# Patient Record
Sex: FEMALE | Race: WHITE | Employment: FULL TIME
[De-identification: names, ages, dates, MRNs, and addresses within clinical notes are randomized per-mention and may not be internally consistent; named-entity substitution may affect disease eponyms.]

---

## 2017-11-11 ENCOUNTER — HEALTH MAINTENANCE LETTER (OUTPATIENT)
Age: 57
End: 2017-11-11

## 2017-11-25 ENCOUNTER — HEALTH MAINTENANCE LETTER (OUTPATIENT)
Age: 57
End: 2017-11-25

## 2018-03-05 ENCOUNTER — OFFICE VISIT (OUTPATIENT)
Dept: OBGYN | Facility: CLINIC | Age: 58
End: 2018-03-05
Payer: COMMERCIAL

## 2018-03-05 VITALS
HEART RATE: 72 BPM | WEIGHT: 189 LBS | BODY MASS INDEX: 30.74 KG/M2 | SYSTOLIC BLOOD PRESSURE: 120 MMHG | DIASTOLIC BLOOD PRESSURE: 74 MMHG

## 2018-03-05 DIAGNOSIS — Z01.419 ENCOUNTER FOR GYNECOLOGICAL EXAMINATION WITHOUT ABNORMAL FINDING: Primary | ICD-10-CM

## 2018-03-05 DIAGNOSIS — B97.7 HIGH RISK HPV INFECTION: ICD-10-CM

## 2018-03-05 PROCEDURE — 87624 HPV HI-RISK TYP POOLED RSLT: CPT | Performed by: OBSTETRICS & GYNECOLOGY

## 2018-03-05 PROCEDURE — 88175 CYTOPATH C/V AUTO FLUID REDO: CPT | Performed by: OBSTETRICS & GYNECOLOGY

## 2018-03-05 PROCEDURE — 99396 PREV VISIT EST AGE 40-64: CPT | Performed by: OBSTETRICS & GYNECOLOGY

## 2018-03-05 NOTE — MR AVS SNAPSHOT
"              After Visit Summary   3/5/2018    Margarette Hardin    MRN: [id number]           Patient Information     Date Of Birth          1960        Visit Information        Provider Department      3/5/2018 11:15 AM Pola Blanco MD [facility]        Today's Diagnoses     Encounter for gynecological examination without abnormal finding    -  1    High risk HPV infection           Follow-ups after your visit        Follow-up notes from your care team     Return in about 1 year (around 3/5/2019).      Who to contact     If you have questions or need follow up information about today's clinic visit or your schedule please contact [facility] directly at [phone].  Normal or non-critical lab and imaging results will be communicated to you by MyChart, letter or phone within 4 business days after the clinic has received the results. If you do not hear from us within 7 days, please contact the clinic through MyChart or phone. If you have a critical or abnormal lab result, we will notify you by phone as soon as possible.  Submit refill requests through [facility] or call your pharmacy and they will forward the refill request to us. Please allow 3 business days for your refill to be completed.          Additional Information About Your Visit        MyChart Information     [facility] lets you send messages to your doctor, view your test results, renew your prescriptions, schedule appointments and more. To sign up, go to [email or web address] . Click on \"Log in\" on the left side of the screen, which will take you to the Welcome page. Then click on \"Sign up Now\" on the right side of the page.     You will be asked to enter the access code listed below, as well as some personal information. Please follow the directions to create your username and password.     Your access code is: [email or web address]  Expires: 6/3/2018 12:59 PM     Your access code will  in 90 days. If you need help " or a new code, please call your [facility] or [phone].        Care EveryWhere ID     This is your Care EveryWhere ID. This could be used by other organizations to access your [facility] medical records  [id number]        Your Vitals Were     Pulse Last Period BMI (Body Mass Index)             72 04/01/2012 30.74 kg/m2          Blood Pressure from Last 3 Encounters:   03/05/18 120/74   12/07/16 118/72   10/24/16 116/70    Weight from Last 3 Encounters:   03/05/18 189 lb (85.7 kg)   12/07/16 190 lb (86.2 kg)   10/24/16 181 lb (82.1 kg)              We Performed the Following     HPV High Risk Types DNA Cervical     Pap imaged thin layer diagnostic with HPV (select HPV order below)        Primary Care Provider Office Phone # Fax #    Jesica Patel [phone]       [address] [facility] [address]  [facility] [address]        Equal Access to Services     [facility]: Hadii aad ku hadasho Soomaali, waaxda luqadaha, qaybta kaalmada adeegyada, waxay idiin hayaan anat menard . So [facility] [phone].    ATENCIÓN: Si habla español, tiene a davidson disposición servicios gratuitos de asistencia lingüística. Llame al [phone].    We comply with applicable federal civil rights laws and [place] laws. We do not discriminate on the basis of race, color, national origin, age, disability, sex, sexual orientation, or gender identity.            Thank you!     Thank you for choosing [facility]  for your care. Our goal is always to provide you with excellent care. Hearing back from our patients is one way we can continue to improve our services. Please take a few minutes to complete the written survey that you may receive in the mail after your visit with us. Thank you!             Your Updated Medication List - Protect others around you: Learn how to safely use, store and throw away your medicines at www.disposemymeds.org.          This list is accurate as of 3/5/18 12:59 PM.   Always use your most recent med list.                   Brand Name Dispense Instructions for use Diagnosis    Multi-vitamin Tabs tablet   Generic drug:  multivitamin, therapeutic with minerals     30    1 TABLET DAILY    Contact dermatitis and other eczema, due to unspecified cause       order for DME     4 Device    Equipment being ordered:  Compression socks  15-20 mm hg  Dispense 4 pairs  Refill prn    Right foot pain, Foot swelling

## 2018-03-05 NOTE — PROGRESS NOTES
SUBJECTIVE:  Margarette Hardin is an 57 year old  P:2 postmenopausal woman who presents for annual gyn exam.       Past Medical History:   Diagnosis Date     Enthesopathy of hip region quiet     was prob a piriformis syndrome      H/O colposcopy with cervical biopsy 10/24/16    TYLER I     High risk HPV infection 16    HPV 16 with NIL pap       Family History   Problem Relation Age of Onset     Arthritis Paternal Grandmother      CANCER Father      lung cancer     DIABETES Father      CANCER Maternal Grandmother      breast cancer with mets     DIABETES Mother      insulin dependent     Eye Disorder Mother      cataracts and glaucoma diabetic retinopathy     OSTEOPOROSIS Mother      Cardiovascular Mother      cardiomyopathy     CANCER Mother      Stomach CA -  at age 80 -      HEART DISEASE Sister      tumor in heart     HEART DISEASE Sister      heart murmur and valve replacemtent due to rheumatic fever       Past Surgical History:   Procedure Laterality Date     C NONSPECIFIC PROCEDURE      ERCP     HC REMOVAL GALLBLADDER      laparoscopic cholecystectomy       Current Outpatient Prescriptions   Medication     MULTI-VITAMIN OR TABS     order for DME     No current facility-administered medications for this visit.      Allergies   Allergen Reactions     Amoxicillin Hives       Social History   Substance Use Topics     Smoking status: Never Smoker     Smokeless tobacco: Never Used     Alcohol use Yes      Comment: rare       ROS:  CONSTITUTIONAL: NEGATIVE for fever, chills  INTEGUMENTARY/SKIN: NEGATIVE for worrisome rashes, moles or lesions  EYES: NEGATIVE for vision changes   ENT/MOUTH: NEGATIVE for ear, mouth and throat problems  RESP: NEGATIVE for significant cough or SOB  BREAST: NEGATIVE for masses, tenderness or discharge  CV: NEGATIVE for chest pain, palpitations   GI: NEGATIVE for nausea, abdominal pain, heartburn, or change in bowel habits  : NEGATIVE for frequency, dysuria, or  hematuria  MUSCULOSKELETAL: NEGATIVE for significant arthralgias or myalgia  NEURO: NEGATIVE for weakness, dizziness or paresthesias or headache  ENDOCRINE: NEGATIVE for temperature intolerance, skin/hair changes  HEME: NEGATIVE for bleeding problems  PSYCHIATRIC: NEGATIVE for changes in mood or affect    OBJECTIVE:  Exam:  GENERAL APPEARANCE: healthy, alert and no distress  EYES: EOMI,  PERRL  HENT: ear canals and TM's normal and nose and mouth without ulcers or lesions  RESP: lungs clear to auscultation - no rales, rhonchi or wheezes  CV: regular rates and rhythm, normal S1 S2, no S3 or S4 and no murmur, click or rub -  BREASTS; no mass  ABDOMEN:  soft, nontender, no HSM or masses and bowel sounds normal    Pelvic Exam:  Urethra; negative  Vulva: No external lesions, normal hair distribution, no adenopathy  Vagina: Moist, pink, no abnormal discharge, well rugated, no lesions  Cervix: Pap smear is taken, parous, smooth, pink, no visible lesions  Uterus: Normal size, anteverted, non-tender, mobile   Ovaries: No mass, non-tender, mobile      ASSESSMENT/PLAN:  Gyn exam/pap smear    PE: reviewed health maintenance including diet, regular exercise and periodic exams.  Health Maintenance   Topic Date Due     HEPATITIS C SCREENING  1977     ADVANCE DIRECTIVE PLANNING Q5 YRS  2016     HPV Q1 Year  2017     PAP Q1 YR DIAGNOSTIC  10/24/2017     INFLUENZA VACCINE (SYSTEM ASSIGNED)  2018     MAMMO SCREEN Q2 YR (SYSTEM ASSIGNED)  10/24/2018     TETANUS IMMUNIZATION (SYSTEM ASSIGNED)  2020     COLON CANCER SCREEN (SYSTEM ASSIGNED)  2020     LIPID SCREEN Q5 YR FEMALE (SYSTEM ASSIGNED)  2021           SUBJECTIVE:  Margarette Hardin is an 57 year old  P:2 postmenopausal woman who presents for annual gyn exam.     Past Medical History:   Diagnosis Date     Enthesopathy of hip region quiet     was prob a piriformis syndrome      H/O colposcopy with cervical biopsy 10/24/16    TYLER I     High  risk HPV infection 16    HPV 16 with NIL pap       Family History   Problem Relation Age of Onset     Arthritis Paternal Grandmother      CANCER Father      lung cancer     DIABETES Father      CANCER Maternal Grandmother      breast cancer with mets     DIABETES Mother      insulin dependent     Eye Disorder Mother      cataracts and glaucoma diabetic retinopathy     OSTEOPOROSIS Mother      Cardiovascular Mother      cardiomyopathy     CANCER Mother      Stomach CA -  at age 80 - 2005     HEART DISEASE Sister      tumor in heart     HEART DISEASE Sister      heart murmur and valve replacemtent due to rheumatic fever       Past Surgical History:   Procedure Laterality Date     C NONSPECIFIC PROCEDURE      ERCP     HC REMOVAL GALLBLADDER      laparoscopic cholecystectomy       Current Outpatient Prescriptions   Medication     MULTI-VITAMIN OR TABS     order for DME     No current facility-administered medications for this visit.      Allergies   Allergen Reactions     Amoxicillin Hives       Social History   Substance Use Topics     Smoking status: Never Smoker     Smokeless tobacco: Never Used     Alcohol use Yes      Comment: rare       ROS:  CONSTITUTIONAL: NEGATIVE for fever, chills  INTEGUMENTARY/SKIN: NEGATIVE for worrisome rashes, moles or lesions  EYES: NEGATIVE for vision changes   ENT/MOUTH: NEGATIVE for ear, mouth and throat problems  RESP: NEGATIVE for significant cough or SOB  BREAST: NEGATIVE for masses, tenderness or discharge  CV: NEGATIVE for chest pain, palpitations   GI: NEGATIVE for nausea, abdominal pain, heartburn, or change in bowel habits  : vaginal dryness  MUSCULOSKELETAL: NEGATIVE for significant arthralgias or myalgia  NEURO: NEGATIVE for weakness, dizziness or paresthesias or headache  ENDOCRINE: NEGATIVE for temperature intolerance, skin/hair changes  HEME: NEGATIVE for bleeding problems  PSYCHIATRIC: NEGATIVE for changes in mood or affect    OBJECTIVE:  Exam:  GENERAL  APPEARANCE: healthy, alert and no distress  EYES: EOMI,  PERRL  HENT: ear canals and TM's normal and nose and mouth without ulcers or lesions  RESP: lungs clear to auscultation - no rales, rhonchi or wheezes  CV: regular rates and rhythm, normal S1 S2, no S3 or S4 and no murmur, click or rub -  BREASTS: no mass  ABDOMEN:  soft, nontender, no HSM or masses and bowel sounds normal    Pelvic Exam:  Urethra; negative  Vulva: No external lesions, normal hair distribution, no adenopathy  Vagina: Moist, pink, no abnormal discharge, well rugated, no lesions  Cervix: Pap smear is taken, parous, smooth, pink, no visible lesions  Uterus: Normal size, anteverted, non-tender, mobile   Ovaries: No mass, non-tender, mobile      ASSESSMENT/PLAN:  Vaginal dryness      -consider vaginal estrogen    (Z01.419) Encounter for gynecological examination without abnormal finding  (primary encounter diagnosis)  Plan: Pap imaged thin layer diagnostic with HPV         (select HPV order below), HPV High Risk Types         DNA Cervical         (B97.7) High risk HPV infection  Plan: Pap imaged thin layer diagnostic with HPV         (select HPV order below), HPV High Risk Types         DNA Cervical             PE: reviewed health maintenance including diet, regular exercise and periodic exams.  Health Maintenance   Topic Date Due     HEPATITIS C SCREENING  11/19/1977     ADVANCE DIRECTIVE PLANNING Q5 YRS  02/16/2016     HPV Q1 Year  09/08/2017     PAP Q1 YR DIAGNOSTIC  10/24/2017     INFLUENZA VACCINE (SYSTEM ASSIGNED)  09/01/2018     MAMMO SCREEN Q2 YR (SYSTEM ASSIGNED)  10/24/2018     TETANUS IMMUNIZATION (SYSTEM ASSIGNED)  02/25/2020     COLON CANCER SCREEN (SYSTEM ASSIGNED)  05/13/2020     LIPID SCREEN Q5 YR FEMALE (SYSTEM ASSIGNED)  09/08/2021

## 2018-03-05 NOTE — LETTER
March 14, 2018    Margarette Hardin  [address] [facility] [address]    Dear Margarette,  We are happy to inform you that your PAP smear result from 03/05/18 is normal.  We are now able to do a follow up test on PAP smears. The DNA test is for HPV (Human Papilloma Virus). Cervical cancer is closely linked with certain types of HPV. Your result showed no evidence of high risk HPV.  Therefore we recommend you return in 3 years for your next pap smear and HPV test.  You will still need to return to the clinic every year for an annual exam and other preventive tests.  Please contact the clinic at [phone] with any questions.  Sincerely,    Pola Blanco MD/[facility]

## 2018-03-05 NOTE — NURSING NOTE
"Chief Complaint   Patient presents with     Gyn Exam     pap and breast exam - colp 10/24/16 - pap 09/08/16 NIL - HPV 16 DNA - mammogram 10/24/16       Initial /74 (BP Location: Right arm, Patient Position: Chair, Cuff Size: Adult Regular)  Pulse 72  Wt 189 lb (85.7 kg)  LMP 04/01/2012  BMI 30.74 kg/m2 Estimated body mass index is 30.74 kg/(m^2) as calculated from the following:    Height as of 12/7/16: 5' 5.75\" (1.67 m).    Weight as of this encounter: 189 lb (85.7 kg).  Medication Reconciliation: complete     Nurse assisted visit.  Jayde Dorado MA.      "

## 2018-03-07 LAB
COPATH REPORT: NORMAL
PAP: NORMAL

## 2018-03-08 LAB
FINAL DIAGNOSIS: NORMAL
HPV HR 12 DNA CVX QL NAA+PROBE: NEGATIVE
HPV16 DNA SPEC QL NAA+PROBE: NEGATIVE
HPV18 DNA SPEC QL NAA+PROBE: NEGATIVE
SPECIMEN DESCRIPTION: NORMAL
SPECIMEN SOURCE CVX/VAG CYTO: NORMAL

## 2019-09-20 ENCOUNTER — OFFICE VISIT (OUTPATIENT)
Dept: OBGYN | Facility: CLINIC | Age: 59
End: 2019-09-20
Payer: COMMERCIAL

## 2019-09-20 VITALS
BODY MASS INDEX: 30.82 KG/M2 | DIASTOLIC BLOOD PRESSURE: 70 MMHG | HEIGHT: 66 IN | SYSTOLIC BLOOD PRESSURE: 124 MMHG | WEIGHT: 191.8 LBS

## 2019-09-20 DIAGNOSIS — N95.2 ATROPHIC VAGINITIS: ICD-10-CM

## 2019-09-20 DIAGNOSIS — Z12.31 ENCOUNTER FOR SCREENING MAMMOGRAM FOR BREAST CANCER: ICD-10-CM

## 2019-09-20 DIAGNOSIS — Z01.419 ENCOUNTER FOR GYNECOLOGICAL EXAMINATION WITHOUT ABNORMAL FINDING: Primary | ICD-10-CM

## 2019-09-20 PROCEDURE — 99396 PREV VISIT EST AGE 40-64: CPT | Performed by: OBSTETRICS & GYNECOLOGY

## 2019-09-20 RX ORDER — ESTRADIOL 0.1 MG/G
1 CREAM VAGINAL AT BEDTIME
Qty: 42.5 G | Refills: 3 | Status: SHIPPED | OUTPATIENT
Start: 2019-09-20 | End: 2019-10-11

## 2019-09-20 RX ORDER — CALCIUM CARBONATE 500(1250)
1 TABLET ORAL AT BEDTIME
COMMUNITY

## 2019-09-20 ASSESSMENT — MIFFLIN-ST. JEOR: SCORE: 1461.75

## 2019-09-20 NOTE — PROGRESS NOTES
SUBJECTIVE:   CC: Margarette Hardin is an 58 year old woman who presents for preventive health visit.     Healthy Habits:    Do you get at least three servings of calcium containing foods daily (dairy, green leafy vegetables, etc.)? yes    Amount of exercise or daily activities, outside of work: 2 day(s) per week    Problems taking medications regularly not applicable    Medication side effects: No    Have you had an eye exam in the past two years? no    Do you see a dentist twice per year? yes    Do you have sleep apnea, excessive snoring or daytime drowsiness?yes      Pt c/o vaginal dryness and would like to try some vaginal estradiol.  Has been  x 6 years on no HRT in the past.  No VB or abnl discharge.    Today's PHQ-2 Score:   PHQ-2 ( 1999 [employer]) 12/7/2016 9/8/2016   Q1: Little interest or pleasure in doing things 0 0   Q2: Feeling down, depressed or hopeless 0 0   PHQ-2 Score 0 0       Abuse: Current or Past(Physical, Sexual or Emotional)- No  Do you feel safe in your environment? Yes    Social History     Tobacco Use     Smoking status: Never Smoker     Smokeless tobacco: Never Used   Substance Use Topics     Alcohol use: Yes     Comment: rare     If you drink alcohol do you typically have >3 drinks per day or >7 drinks per week? No                     Reviewed orders with patient.  Reviewed health maintenance and updated orders accordingly - Yes  Current Outpatient Medications   Medication Sig Dispense Refill     calcium carbonate (OS-KINGS) 500 MG tablet Take 1 tablet by mouth At Bedtime       estradiol (ESTRACE) 0.1 MG/GM vaginal cream Place 1 g vaginally At Bedtime for 21 days May repeat course every 3 months as needed. 42.5 g 3     MULTI-VITAMIN OR TABS 1 TABLET DAILY 30 0     order for DME Equipment being ordered:   Compression socks   15-20 mm hg   Dispense 4 pairs   Refill prn 4 Device prn     Allergies   Allergen Reactions     Amoxicillin Hives       Mammogram Screening: Patient over age 50, mutual  decision to screen reflected in health maintenance.    Pertinent mammograms are reviewed under the imaging tab.  History of abnormal Pap smear: YES - updated in Problem List and Health Maintenance accordingly  PAP / HPV Latest Ref Rng & Units 3/5/2018 2016 2013   PAP - NIL NIL NIL   HPV 16 DNA NEG:Negative Negative Positive(A) -   HPV 18 DNA NEG:Negative Negative Negative -   OTHER HR HPV NEG:Negative Negative Negative -     Reviewed and updated as needed this visit by clinical staff  Tobacco  Allergies  Meds  Med Hx  Surg Hx  Fam Hx  Soc Hx        Reviewed and updated as needed this visit by Provider  Tobacco  Allergies  Meds  Med Hx  Surg Hx  Fam Hx  Soc Hx       Past Medical History:   Diagnosis Date     Enthesopathy of hip region quiet     was prob a piriformis syndrome      H/O colposcopy with cervical biopsy 10/24/16    TYLER I     High risk HPV infection 16    HPV 16 with NIL pap      Past Surgical History:   Procedure Laterality Date     C NONSPECIFIC PROCEDURE      ERCP     HC REMOVAL GALLBLADDER      laparoscopic cholecystectomy     OB History    Para Term  AB Living   2 2 2 0 0 2   SAB TAB Ectopic Multiple Live Births   0 0 0 0 2      # Outcome Date GA Lbr Martir/2nd Weight Sex Delivery Anes PTL Lv   2 Term     F Vag-Spont   JEAN MARIE   1 Term     M Vag-Spont   JEAN MARIE       ROS:  CONSTITUTIONAL: NEGATIVE for fever, chills, change in weight  INTEGUMENTARY/SKIN: NEGATIVE for worrisome rashes, moles or lesions  EYES: NEGATIVE for vision changes or irritation  ENT: NEGATIVE for ear, mouth and throat problems  RESP: NEGATIVE for significant cough or SOB  BREAST: NEGATIVE for masses, tenderness or discharge  CV: NEGATIVE for chest pain, palpitations or peripheral edema  GI: NEGATIVE for nausea, abdominal pain, heartburn, or change in bowel habits  : NEGATIVE for unusual urinary or vaginal symptoms. No vaginal bleeding.  MUSCULOSKELETAL: NEGATIVE for significant arthralgias or  "myalgia  NEURO: NEGATIVE for weakness, dizziness or paresthesias  PSYCHIATRIC: NEGATIVE for changes in mood or affect     OBJECTIVE:   /70   Ht 1.676 m (5' 6\")   Wt 87 kg (191 lb 12.8 oz)   LMP 04/01/2012   BMI 30.96 kg/m    EXAM:  GENERAL APPEARANCE: healthy, alert and no distress  EYES: Eyes grossly normal to inspection, PERRL and conjunctivae and sclerae normal  HENT: ear canals and TM's normal, nose and mouth without ulcers or lesions, oropharynx clear and oral mucous membranes moist  NECK: no adenopathy, no asymmetry, masses, or scars and thyroid normal to palpation  RESP: lungs clear to auscultation - no rales, rhonchi or wheezes  BREAST: normal without masses, tenderness or nipple discharge and no palpable axillary masses or adenopathy  CV: regular rate and rhythm, normal S1 S2, no S3 or S4, no murmur, click or rub, no peripheral edema and peripheral pulses strong  ABDOMEN: soft, nontender, no hepatosplenomegaly, no masses and bowel sounds normal   (female): normal female external genitalia, normal urethral meatus, vaginal mucosal atrophy noted, normal cervix, adnexae, and uterus without masses or abnormal discharge  MS: no musculoskeletal defects are noted and gait is age appropriate without ataxia  SKIN: no suspicious lesions or rashes  NEURO: Normal strength and tone, sensory exam grossly normal, mentation intact and speech normal  PSYCH: mentation appears normal and affect normal/bright    Diagnostic Test Results:  none     ASSESSMENT/PLAN:       ICD-10-CM    1. Encounter for gynecological examination without abnormal finding Z01.419    2. Encounter for screening mammogram for breast cancer Z12.31 MA Screening Digital Bilateral   3. Atrophic vaginitis N95.2 estradiol (ESTRACE) 0.1 MG/GM vaginal cream     Rx Estrace vag cream 1 g at bedtime x 3 weeks.  Repeat course Q 3 months.    Mammogram scheduled.    COUNSELING:   Reviewed preventive health counseling, as reflected in patient instructions   " "    ([place]ne)menopause management    Estimated body mass index is 30.96 kg/m  as calculated from the following:    Height as of this encounter: 1.676 m (5' 6\").    Weight as of this encounter: 87 kg (191 lb 12.8 oz).    Weight management plan: Discussed healthy diet and exercise guidelines     reports that she has never smoked. She has never used smokeless tobacco.      Counseling Resources:  ATP IV Guidelines  Pooled Cohorts Equation Calculator  Breast Cancer Risk Calculator  FRAX Risk Assessment  ICSI Preventive Guidelines  Dietary Guidelines for Americans, 2010  USDA's MyPlate  ASA Prophylaxis  Lung CA Screening    Don Woodson MD  [facility]  "

## 2019-09-20 NOTE — NURSING NOTE
"Chief Complaint   Patient presents with     Gyn Exam   c/o vaginal dryness    Initial /70   Ht 1.676 m (5' 6\")   Wt 87 kg (191 lb 12.8 oz)   LMP 2012   BMI 30.96 kg/m   Estimated body mass index is 30.96 kg/m  as calculated from the following:    Height as of this encounter: 1.676 m (5' 6\").    Weight as of this encounter: 87 kg (191 lb 12.8 oz).  BP completed using cuff size: large    Questioned patient about current smoking habits.  Pt. has never smoked.          The following HM Due: mammogram  Chantal Ariza CMA               "

## 2019-09-25 ENCOUNTER — HOSPITAL ENCOUNTER (OUTPATIENT)
Dept: MAMMOGRAPHY | Facility: CLINIC | Age: 59
Discharge: HOME | End: 2019-09-25
Attending: OBSTETRICS & GYNECOLOGY | Admitting: OBSTETRICS & GYNECOLOGY
Payer: COMMERCIAL

## 2019-09-25 DIAGNOSIS — Z12.31 ENCOUNTER FOR SCREENING MAMMOGRAM FOR BREAST CANCER: ICD-10-CM

## 2019-09-25 PROCEDURE — 77067 SCR MAMMO BI INCL CAD: CPT

## 2020-11-20 ENCOUNTER — OFFICE VISIT (OUTPATIENT)
Dept: OBGYN | Facility: CLINIC | Age: 60
End: 2020-11-20
Payer: COMMERCIAL

## 2020-11-20 VITALS — SYSTOLIC BLOOD PRESSURE: 138 MMHG | BODY MASS INDEX: 31.15 KG/M2 | WEIGHT: 193 LBS | DIASTOLIC BLOOD PRESSURE: 78 MMHG

## 2020-11-20 DIAGNOSIS — R10.32 LEFT GROIN PAIN: ICD-10-CM

## 2020-11-20 DIAGNOSIS — L65.9 HAIR LOSS: ICD-10-CM

## 2020-11-20 DIAGNOSIS — N87.0 DYSPLASIA OF CERVIX, LOW GRADE (CIN 1): ICD-10-CM

## 2020-11-20 DIAGNOSIS — Z00.00 ROUTINE GENERAL MEDICAL EXAMINATION AT A HEALTH CARE FACILITY: Primary | ICD-10-CM

## 2020-11-20 DIAGNOSIS — Z12.31 VISIT FOR SCREENING MAMMOGRAM: ICD-10-CM

## 2020-11-20 DIAGNOSIS — N95.2 ATROPHIC VAGINITIS: ICD-10-CM

## 2020-11-20 PROCEDURE — 87624 HPV HI-RISK TYP POOLED RSLT: CPT | Performed by: OBSTETRICS & GYNECOLOGY

## 2020-11-20 PROCEDURE — 99396 PREV VISIT EST AGE 40-64: CPT | Performed by: OBSTETRICS & GYNECOLOGY

## 2020-11-20 PROCEDURE — 84443 ASSAY THYROID STIM HORMONE: CPT | Performed by: OBSTETRICS & GYNECOLOGY

## 2020-11-20 PROCEDURE — 99214 OFFICE O/P EST MOD 30 MIN: CPT | Mod: 25 | Performed by: OBSTETRICS & GYNECOLOGY

## 2020-11-20 PROCEDURE — 36415 COLL VENOUS BLD VENIPUNCTURE: CPT | Performed by: OBSTETRICS & GYNECOLOGY

## 2020-11-20 RX ORDER — ESTRADIOL 0.1 MG/G
1 CREAM VAGINAL DAILY
Qty: 21 G | Refills: 3 | Status: SHIPPED | OUTPATIENT
Start: 2020-11-20 | End: 2020-12-11

## 2020-11-20 NOTE — LETTER
December 2, 2020      Margarette Hardin  [address] [facility] [address]        Dear ,    We are happy to inform you that your recent Pap smear and Human Papillomavirus (HPV) test results are normal and negative.    It is recommended that you have your next Pap smear and Human Papillomavirus (HPV) test in 3 years. You will also need to return to the clinic every year for an annual wellness visit.    If you have additional questions regarding this result, please contact our office and we will be happy to assist you.      Sincerely,    Your [facility] Care Team

## 2020-11-20 NOTE — NURSING NOTE
"Chief Complaint   Patient presents with     Gyn Exam     Annual        Initial /78 (BP Location: Right arm, Patient Position: Sitting, Cuff Size: Adult Regular)   Wt 87.5 kg (193 lb)   LMP 2012   BMI 31.15 kg/m   Estimated body mass index is 31.15 kg/m  as calculated from the following:    Height as of 19: 1.676 m (5' 6\").    Weight as of this encounter: 87.5 kg (193 lb).  BP completed using cuff size: regular    Questioned patient about current smoking habits.  Pt. has never smoked.          The following HM Due: NONE    Everette Kyle CMA              "

## 2020-11-20 NOTE — PROGRESS NOTES
SUBJECTIVE:   CC: Margarette Hardin is an 60 year old woman who presents for preventive health visit.       Patient has been advised of split billing requirements and indicates understanding: Yes  Healthy Habits:    Do you get at least three servings of calcium containing foods daily (dairy, green leafy vegetables, etc.)? yes    Amount of exercise or daily activities, outside of work: 2-3 day(s) per week    Problems taking medications regularly No    Medication side effects: No    Have you had an eye exam in the past two years? no    Do you see a dentist twice per year? yes    Do you have sleep apnea, excessive snoring or daytime drowsiness?no    Pt had prior Pap-WNL, Pos HPV 16 (9/8/2016), Colpo (10/24/2016) - EctoCx TYLER 1, ECC- ?TYLER.  Pap-WNL, HPV Neg (3/5/2018).  Will do her Pap/HPV today although it is 4 months earlier than usual.    Is using Estradiol cream for atrophic vaginitis with some improvement.  Wants Rx.    Also c/o left groin pain:    LEFT GROIN PAIN     Onset: 3-4 months ago    Description: Has had episodes of left groin pain  Character: Sharp  Location: left inguinal region  Radiation: upwards to left side of abdomen    Intensity: moderate    Progression of Symptoms:  intermittent    Accompanying Signs & Symptoms:  Fever/Chills?: no   Gas/Bloating: no   Nausea: no   Vomiting: no   Diarrhea?: no   Constipation:no   Dysuria or Hematuria: no    History:   Trauma: no   Previous similar pain: no    Previous tests done: none    Precipitating factors:   Does the pain change with:     Food: no      BM: no     Urination: no     Alleviating factors:  None    Therapies Tried and outcome: None-resolves each time after less than a minute typically    LMP:   on vaginal estradiol only       Also c/o some increased hair loss.    Today's PHQ-2 Score:   PHQ-2 ( 1999 Pfizer) 12/7/2016 9/8/2016   Q1: Little interest or pleasure in doing things 0 0   Q2: Feeling down, depressed or hopeless 0 0   PHQ-2 Score 0 0        Abuse: Current or Past(Physical, Sexual or Emotional)- No  Do you feel safe in your environment? Yes    Have you ever done Advance Care Planning? (For example, a Health Directive, POLST, or a discussion with a medical provider or your loved ones about your wishes): No, advance care planning information given to patient to review.  Patient declined advance care planning discussion at this time.    Social History     Tobacco Use     Smoking status: Never Smoker     Smokeless tobacco: Never Used   Substance Use Topics     Alcohol use: Yes     Comment: rare     If you drink alcohol do you typically have >3 drinks per day or >7 drinks per week? No                     Reviewed orders with patient.  Reviewed health maintenance and updated orders accordingly - Yes  Current Outpatient Medications   Medication Sig Dispense Refill     calcium carbonate (OS-KINGS) 500 MG tablet Take 1 tablet by mouth At Bedtime       MULTI-VITAMIN OR TABS 1 TABLET DAILY 30 0     order for DME Equipment being ordered:   Compression socks   15-20 mm hg   Dispense 4 pairs   Refill prn 4 Device prn     Allergies   Allergen Reactions     Amoxicillin Hives       Mammogram Screening: Patient over age 50, mutual decision to screen reflected in health maintenance.    Pertinent mammograms are reviewed under the imaging tab.  History of abnormal Pap smear: YES - updated in Problem List and Health Maintenance accordingly  PAP / HPV Latest Ref Rng & Units 3/5/2018 9/8/2016 11/11/2013   PAP - NIL NIL NIL   HPV 16 DNA NEG:Negative Negative Positive(A) -   HPV 18 DNA NEG:Negative Negative Negative -   OTHER HR HPV NEG:Negative Negative Negative -     Reviewed and updated as needed this visit by clinical staff  Tobacco  Allergies  Meds   Med Hx  Surg Hx  Fam Hx  Soc Hx        Reviewed and updated as needed this visit by Provider                Past Medical History:   Diagnosis Date     Enthesopathy of hip region quiet     was prob a piriformis  syndrome      H/O colposcopy with cervical biopsy 10/24/16    TYLER I     High risk HPV infection 16    HPV 16 with NIL pap      Past Surgical History:   Procedure Laterality Date     HC REMOVAL GALLBLADDER      laparoscopic cholecystectomy     ZZC NONSPECIFIC PROCEDURE      ERCP     OB History    Para Term  AB Living   2 2 2 0 0 2   SAB TAB Ectopic Multiple Live Births   0 0 0 0 2      # Outcome Date GA Lbr Martir/2nd Weight Sex Delivery Anes PTL Lv   2 Term     F Vag-Spont   JEAN MARIE   1 Term     M Vag-Spont   JEAN MARIE       ROS:  CONSTITUTIONAL: NEGATIVE for fever, chills, change in weight  INTEGUMENTARY/SKIN: NEGATIVE for worrisome rashes, moles or lesions  EYES: NEGATIVE for vision changes or irritation  ENT: NEGATIVE for ear, mouth and throat problems  RESP: NEGATIVE for significant cough or SOB  BREAST: NEGATIVE for masses, tenderness or discharge   CV: NEGATIVE for chest pain, palpitations or peripheral edema  GI: NEGATIVE for nausea, abdominal pain, heartburn, or change in bowel habits  : NEGATIVE for unusual urinary or vaginal symptoms. No vaginal bleeding.  MUSCULOSKELETAL: NEGATIVE for significant arthralgias or myalgia  NEURO: NEGATIVE for weakness, dizziness or paresthesias  PSYCHIATRIC: NEGATIVE for changes in mood or affect     OBJECTIVE:   /78 (BP Location: Right arm, Patient Position: Sitting, Cuff Size: Adult Regular)   Wt 87.5 kg (193 lb)   LMP 2012   BMI 31.15 kg/m    EXAM:  GENERAL APPEARANCE: healthy, alert and no distress  EYES: Eyes grossly normal to inspection, PERRL and conjunctivae and sclerae normal  HENT: ear canals and TM's normal, nose and mouth without ulcers or lesions, oropharynx clear and oral mucous membranes moist  NECK: no adenopathy, no asymmetry, masses, or scars and thyroid normal to palpation  RESP: lungs clear to auscultation - no rales, rhonchi or wheezes  BREAST: normal without masses, tenderness or nipple discharge and no palpable axillary  "masses or adenopathy  CV: regular rate and rhythm, normal S1 S2, no S3 or S4, no murmur, click or rub, no peripheral edema and peripheral pulses strong  ABDOMEN: soft, nontender, no hepatosplenomegaly, no masses and bowel sounds normal   (female): normal female external genitalia except mild atrophy, normal urethral meatus, vaginal mucosal atrophy noted, normal cervix, adnexae, and uterus without masses or abnormal discharge  MS: no musculoskeletal defects are noted and gait is age appropriate without ataxia  SKIN: no suspicious lesions or rashes  NEURO: Normal strength and tone, sensory exam grossly normal, mentation intact and speech normal  PSYCH: mentation appears normal and affect normal/bright    Diagnostic Test Results:  none     ASSESSMENT/PLAN:       ICD-10-CM    1. Routine general medical examination at a health care facility  Z00.00 TSH with free T4 reflex   2. Dysplasia of cervix, low grade (TYLER 1)  N87.0 Pap imaged thin layer diagnostic with HPV (select HPV order below)     HPV High Risk Types DNA Cervical   3. Left groin pain  R10.32 US Pelvic Complete with Transvaginal   4. Atrophic vaginitis  N95.2 estradiol (ESTRACE) 0.1 MG/GM vaginal cream   5. Visit for screening mammogram  Z12.31 MA Screening Digital Bilateral   6. Hair loss  L65.9        Pap/HPV done.  If all neg, next due 3 years.    TSH.  If abnormal, refer to PCP.    Mammogram    Pelvic U/S to confirm no left adnexal pathology.  If present, will address accordingly.  If neg and left groin pain continues, Pt to f/u w/ PCP.    Rx Estrace vag cream.    Patient has been advised of split billing requirements and indicates understanding: Yes  COUNSELING:   Reviewed preventive health counseling, as reflected in patient instructions    Estimated body mass index is 31.15 kg/m  as calculated from the following:    Height as of 9/20/19: 1.676 m (5' 6\").    Weight as of this encounter: 87.5 kg (193 lb).    Weight management plan: Discussed healthy diet " and exercise guidelines    She reports that she has never smoked. She has never used smokeless tobacco.      Counseling Resources:  ATP IV Guidelines  Pooled Cohorts Equation Calculator  Breast Cancer Risk Calculator  BRCA-Related Cancer Risk Assessment: FHS-7 Tool  FRAX Risk Assessment  ICSI Preventive Guidelines  Dietary Guidelines for Americans, 2010  USDA's MyPlate  ASA Prophylaxis  Lung CA Screening    Don Woodson MD  [facility] [place]

## 2020-11-21 LAB — TSH SERPL DL<=0.005 MIU/L-ACNC: 3.43 MU/L (ref 0.4–4)

## 2020-11-25 LAB
COPATH REPORT: NORMAL
PAP: NORMAL

## 2020-12-07 ENCOUNTER — ANCILLARY PROCEDURE (OUTPATIENT)
Dept: ULTRASOUND IMAGING | Facility: CLINIC | Age: 60
End: 2020-12-07
Attending: OBSTETRICS & GYNECOLOGY
Payer: COMMERCIAL

## 2020-12-07 DIAGNOSIS — R10.32 LEFT GROIN PAIN: ICD-10-CM

## 2020-12-07 PROCEDURE — 76830 TRANSVAGINAL US NON-OB: CPT | Performed by: FAMILY MEDICINE

## 2020-12-07 PROCEDURE — 76856 US EXAM PELVIC COMPLETE: CPT | Performed by: FAMILY MEDICINE

## 2020-12-19 ENCOUNTER — RESULTS ONLY (OUTPATIENT)
Dept: LAB | Age: 60
End: 2020-12-19

## 2020-12-19 ENCOUNTER — OFFICE VISIT (OUTPATIENT)
Dept: URGENT CARE | Facility: URGENT CARE | Age: 60
End: 2020-12-19
Payer: COMMERCIAL

## 2020-12-19 DIAGNOSIS — Z53.9 ERRONEOUS ENCOUNTER--DISREGARD: Primary | ICD-10-CM

## 2020-12-19 LAB
SARS-COV-2 RNA SPEC QL NAA+PROBE: NORMAL
SPECIMEN SOURCE: NORMAL

## 2020-12-20 LAB
LABORATORY COMMENT REPORT: NORMAL
SARS-COV-2 RNA SPEC QL NAA+PROBE: NEGATIVE
SPECIMEN SOURCE: NORMAL

## 2021-03-08 ENCOUNTER — HOSPITAL ENCOUNTER (OUTPATIENT)
Dept: MAMMOGRAPHY | Facility: CLINIC | Age: 61
Discharge: HOME | End: 2021-03-08
Attending: OBSTETRICS & GYNECOLOGY | Admitting: OBSTETRICS & GYNECOLOGY
Payer: COMMERCIAL

## 2021-03-08 DIAGNOSIS — Z12.31 VISIT FOR SCREENING MAMMOGRAM: ICD-10-CM

## 2021-03-08 PROCEDURE — 77067 SCR MAMMO BI INCL CAD: CPT

## 2021-06-29 ENCOUNTER — OFFICE VISIT (OUTPATIENT)
Dept: INTERNAL MEDICINE | Facility: CLINIC | Age: 61
End: 2021-06-29
Payer: COMMERCIAL

## 2021-06-29 ENCOUNTER — TELEPHONE (OUTPATIENT)
Dept: VASCULAR SURGERY | Facility: CLINIC | Age: 61
End: 2021-06-29

## 2021-06-29 VITALS
WEIGHT: 192 LBS | TEMPERATURE: 98.3 F | SYSTOLIC BLOOD PRESSURE: 138 MMHG | BODY MASS INDEX: 30.86 KG/M2 | HEART RATE: 65 BPM | RESPIRATION RATE: 20 BRPM | OXYGEN SATURATION: 98 % | HEIGHT: 66 IN | DIASTOLIC BLOOD PRESSURE: 76 MMHG

## 2021-06-29 DIAGNOSIS — Z12.11 SPECIAL SCREENING FOR MALIGNANT NEOPLASMS, COLON: ICD-10-CM

## 2021-06-29 DIAGNOSIS — I83.891 VARICOSE VEINS OF LEG WITH SWELLING, RIGHT: ICD-10-CM

## 2021-06-29 DIAGNOSIS — Z00.00 ROUTINE GENERAL MEDICAL EXAMINATION AT A HEALTH CARE FACILITY: Primary | ICD-10-CM

## 2021-06-29 DIAGNOSIS — Z11.59 NEED FOR HEPATITIS C SCREENING TEST: ICD-10-CM

## 2021-06-29 DIAGNOSIS — K64.9 BLEEDING HEMORRHOIDS: ICD-10-CM

## 2021-06-29 DIAGNOSIS — Z11.4 ENCOUNTER FOR SCREENING FOR HIV: ICD-10-CM

## 2021-06-29 DIAGNOSIS — R73.09 ELEVATED GLUCOSE: ICD-10-CM

## 2021-06-29 LAB
BASOPHILS # BLD AUTO: 0 10E9/L (ref 0–0.2)
BASOPHILS NFR BLD AUTO: 0.6 %
DIFFERENTIAL METHOD BLD: NORMAL
EOSINOPHIL # BLD AUTO: 0.1 10E9/L (ref 0–0.7)
EOSINOPHIL NFR BLD AUTO: 1.8 %
ERYTHROCYTE [DISTWIDTH] IN BLOOD BY AUTOMATED COUNT: 12.7 % (ref 10–15)
HBA1C MFR BLD: 5.5 % (ref 0–5.6)
HCT VFR BLD AUTO: 41.3 % (ref 35–47)
HCV AB SERPL QL IA: NONREACTIVE
HGB BLD-MCNC: 13.9 G/DL (ref 11.7–15.7)
HIV 1+2 AB+HIV1 P24 AG SERPL QL IA: NONREACTIVE
LYMPHOCYTES # BLD AUTO: 1.6 10E9/L (ref 0.8–5.3)
LYMPHOCYTES NFR BLD AUTO: 31.7 %
MCH RBC QN AUTO: 30.2 PG (ref 26.5–33)
MCHC RBC AUTO-ENTMCNC: 33.7 G/DL (ref 31.5–36.5)
MCV RBC AUTO: 90 FL (ref 78–100)
MONOCYTES # BLD AUTO: 0.4 10E9/L (ref 0–1.3)
MONOCYTES NFR BLD AUTO: 8.1 %
NEUTROPHILS # BLD AUTO: 2.9 10E9/L (ref 1.6–8.3)
NEUTROPHILS NFR BLD AUTO: 57.8 %
PLATELET # BLD AUTO: 211 10E9/L (ref 150–450)
RBC # BLD AUTO: 4.6 10E12/L (ref 3.8–5.2)
WBC # BLD AUTO: 5.1 10E9/L (ref 4–11)

## 2021-06-29 PROCEDURE — 80050 GENERAL HEALTH PANEL: CPT | Performed by: NURSE PRACTITIONER

## 2021-06-29 PROCEDURE — 87389 HIV-1 AG W/HIV-1&-2 AB AG IA: CPT | Performed by: NURSE PRACTITIONER

## 2021-06-29 PROCEDURE — 36415 COLL VENOUS BLD VENIPUNCTURE: CPT | Performed by: NURSE PRACTITIONER

## 2021-06-29 PROCEDURE — 86803 HEPATITIS C AB TEST: CPT | Performed by: NURSE PRACTITIONER

## 2021-06-29 PROCEDURE — 83036 HEMOGLOBIN GLYCOSYLATED A1C: CPT | Performed by: NURSE PRACTITIONER

## 2021-06-29 PROCEDURE — 80061 LIPID PANEL: CPT | Performed by: NURSE PRACTITIONER

## 2021-06-29 PROCEDURE — 99396 PREV VISIT EST AGE 40-64: CPT | Performed by: NURSE PRACTITIONER

## 2021-06-29 RX ORDER — HYDROCORTISONE 25 MG/G
CREAM TOPICAL 2 TIMES DAILY PRN
Qty: 30 G | Refills: 3 | Status: SHIPPED | OUTPATIENT
Start: 2021-06-29

## 2021-06-29 ASSESSMENT — ENCOUNTER SYMPTOMS
MYALGIAS: 1
ABDOMINAL PAIN: 0
CHILLS: 0
DYSURIA: 0
NAUSEA: 0
ARTHRALGIAS: 0
NERVOUS/ANXIOUS: 1
WEAKNESS: 0
EYE PAIN: 0
BREAST MASS: 0
FEVER: 0
PARESTHESIAS: 0
DIZZINESS: 0
FREQUENCY: 0
CONSTIPATION: 0
JOINT SWELLING: 0
DIARRHEA: 0
HEARTBURN: 0
HEMATURIA: 0
HEMATOCHEZIA: 1
COUGH: 0
HEADACHES: 1
SORE THROAT: 0
PALPITATIONS: 0
SHORTNESS OF BREATH: 0

## 2021-06-29 ASSESSMENT — MIFFLIN-ST. JEOR: SCORE: 1452.66

## 2021-06-29 NOTE — NURSING NOTE
"Chief Complaint   Patient presents with     Physical     fasting     initial /76   Pulse 65   Temp 98.3  F (36.8  C) (Oral)   Resp 20   Ht 1.676 m (5' 6\")   Wt 87.1 kg (192 lb)   LMP 04/01/2012   SpO2 98%   BMI 30.99 kg/m   Estimated body mass index is 30.99 kg/m  as calculated from the following:    Height as of this encounter: 1.676 m (5' 6\").    Weight as of this encounter: 87.1 kg (192 lb)..  bp completed using cuff size large  HU MONSIVAIS LPN  "

## 2021-06-29 NOTE — PROGRESS NOTES
SUBJECTIVE:   CC: Margarette Hardin is an 60 year old woman who presents for preventive health visit.       Patient has been advised of split billing requirements and indicates understanding:   Healthy Habits:     Getting at least 3 servings of Calcium per day:  Yes    Bi-annual eye exam:  Yes    Dental care twice a year:  Yes    Sleep apnea or symptoms of sleep apnea:  None    Diet:  Regular (no restrictions)    Frequency of exercise:  2-3 days/week    Duration of exercise:  15-30 minutes    Taking medications regularly:  Yes    Medication side effects:  None    PHQ-2 Total Score: 0    Additional concerns today:  Yes        Today's PHQ-2 Score:   PHQ-2 ( 1999 Pfizer) 6/29/2021   Q1: Little interest or pleasure in doing things 0   Q2: Feeling down, depressed or hopeless 0   PHQ-2 Score 0   Q1: Little interest or pleasure in doing things Not at all   Q2: Feeling down, depressed or hopeless Not at all   PHQ-2 Score 0       Abuse: Current or Past (Physical, Sexual or Emotional) - No  Do you feel safe in your environment? Yes        Social History     Tobacco Use     Smoking status: Never Smoker     Smokeless tobacco: Never Used   Substance Use Topics     Alcohol use: Yes     Comment: rare         Alcohol Use 6/29/2021   Prescreen: >3 drinks/day or >7 drinks/week? No   Prescreen: >3 drinks/day or >7 drinks/week? -       Reviewed orders with patient.  Reviewed health maintenance and updated orders accordingly - Yes      Breast Cancer Screening:  Any new diagnosis of family breast, ovarian, or bowel cancer? No    FHS-7: No flowsheet data found.      Pertinent mammograms are reviewed under the imaging tab.    History of abnormal Pap smear:   PAP / HPV Latest Ref Rng & Units 11/20/2020 3/5/2018 9/8/2016   PAP - NIL NIL NIL   HPV 16 DNA NEG:Negative Negative Negative Positive(A)   HPV 18 DNA NEG:Negative Negative Negative Negative   OTHER HR HPV NEG:Negative Negative Negative Negative     Reviewed and updated as needed this  "visit by clinical staff  Tobacco  Allergies  Meds  Problems  Med Hx  Surg Hx  Fam Hx  Soc Hx          Reviewed and updated as needed this visit by Provider   Allergies                  Review of Systems   Constitutional: Negative for chills and fever.   HENT: Negative for congestion, ear pain, hearing loss and sore throat.    Eyes: Negative for pain and visual disturbance.   Respiratory: Negative for cough and shortness of breath.    Cardiovascular: Positive for peripheral edema. Negative for chest pain and palpitations.   Gastrointestinal: Positive for hematochezia. Negative for abdominal pain, constipation, diarrhea, heartburn and nausea.   Breasts:  Negative for tenderness, breast mass and discharge.   Genitourinary: Negative for dysuria, frequency, genital sores, hematuria, pelvic pain, urgency, vaginal bleeding and vaginal discharge.   Musculoskeletal: Positive for myalgias. Negative for arthralgias and joint swelling.   Skin: Negative for rash.   Neurological: Positive for headaches. Negative for dizziness, weakness and paresthesias.   Psychiatric/Behavioral: Negative for mood changes. The patient is nervous/anxious.      VASCULAR REFERRAL RIGHT LEG VARICOSE VEIN  COMPRESSION SOCKS     BLOOD IN STOOL - ANUSOL HC DISCUSSED     ALEVE OR IBUPROFEN PRN     HEADACHES WITH BP HIGH AT TIMES   WATCH BP          OBJECTIVE:   /76   Pulse 65   Temp 98.3  F (36.8  C) (Oral)   Resp 20   Ht 1.676 m (5' 6\")   Wt 87.1 kg (192 lb)   LMP 04/01/2012   SpO2 98%   BMI 30.99 kg/m    Physical Exam  GENERAL: alert and no distress  EYES: Eyes grossly normal to inspection, and conjunctivae and sclerae normal  HENT: ear canals and TM's normal, nose and mouth without ulcers or lesions  NECK: no adenopathy, no asymmetry, masses, or scars and thyroid normal to palpation  RESP: lungs clear to auscultation - no rales, rhonchi or wheezes  CV: regular rate and rhythm, normal S1 S2, no S3 or S4, no murmur, click or rub, no " peripheral edema and peripheral pulses strong  ABDOMEN: soft, nontender, no hepatosplenomegaly, no masses and bowel sounds normal  MS: no gross musculoskeletal defects noted, no edema  SKIN: no suspicious lesions or rashes  NEURO: Normal strength and tone, mentation intact and speech normal  PSYCH: mentation appears normal, affect normal/bright    Diagnostic Test Results:  Labs reviewed in [facility]  Lab     ASSESSMENT/PLAN:   1. Routine general medical examination at a health care facility    - Lipid panel reflex to direct LDL Fasting  - Comprehensive metabolic panel (BMP + Alb, Alk Phos, ALT, AST, Total. Bili, TP)  - CBC with platelets and differential  - TSH with free T4 reflex  - Hemoglobin A1c    2. Varicose veins of leg with swelling, right  Causes pain and swelling on leg   She wears compression socks and they help a little   - VASCULAR SURGERY REFERRAL; Future    3. Elevated glucose    - Hemoglobin A1c    4. Need for hepatitis C screening test    - Hepatitis C Screen Reflex to HCV RNA Quant and Genotype    5. Encounter for screening for HIV    - HIV Antigen Antibody Combo    6. Special screening for malignant neoplasms, colon    - GASTROENTEROLOGY ADULT REF PROCEDURE ONLY; Future    7. Bleeding hemorrhoids    - GASTROENTEROLOGY ADULT REF PROCEDURE ONLY; Future  - hydrocortisone, Perianal, (HYDROCORTISONE) 2.5 % cream; Place rectally 2 times daily as needed for hemorrhoids 2 WEEKS ON AND 2 WEEKS OFF  Dispense: 30 g; Refill: 3    Patient has been advised of split billing requirements and indicates understanding:   COUNSELING:  Reviewed preventive health counseling, as reflected in patient instructions       Regular exercise       Healthy diet/nutrition       Osteoporosis prevention/bone health       Consider Hep C screening for all patients one time for ages 18-79 years       HIV screeninx in teen years, 1x in adult years, and at intervals if high risk    Estimated body mass index is 30.99 kg/m  as calculated  "from the following:    Height as of this encounter: 1.676 m (5' 6\").    Weight as of this encounter: 87.1 kg (192 lb).        She reports that she has never smoked. She has never used smokeless tobacco.      Counseling Resources:  ATP IV Guidelines  Pooled Cohorts Equation Calculator  Breast Cancer Risk Calculator  BRCA-Related Cancer Risk Assessment: FHS-7 Tool  FRAX Risk Assessment  ICSI Preventive Guidelines  Dietary Guidelines for Americans, 2010  USDA's MyPlate  ASA Prophylaxis  Lung CA Screening    PÉREZ Dixon CNP  [facility]  "

## 2021-06-29 NOTE — PATIENT INSTRUCTIONS
VASCULAR REFERRAL   THEY SHOULD CALL YOU TO SET UP    LAB IN [address]    COLONOSCOPY   THEY WILL CALL YOU TO SET UP     BALNEOL TO RECTAL AREA IF NEEDED     ANUSOL HC TO RECTAL AREA IF NEEDED

## 2021-06-30 LAB
ALBUMIN SERPL-MCNC: 4.3 G/DL (ref 3.4–5)
ALP SERPL-CCNC: 85 U/L (ref 40–150)
ALT SERPL W P-5'-P-CCNC: 52 U/L (ref 0–50)
ANION GAP SERPL CALCULATED.3IONS-SCNC: 5 MMOL/L (ref 3–14)
AST SERPL W P-5'-P-CCNC: 34 U/L (ref 0–45)
BILIRUB SERPL-MCNC: 1.4 MG/DL (ref 0.2–1.3)
BUN SERPL-MCNC: 16 MG/DL (ref 7–30)
CALCIUM SERPL-MCNC: 9.4 MG/DL (ref 8.5–10.1)
CHLORIDE SERPL-SCNC: 108 MMOL/L (ref 94–109)
CHOLEST SERPL-MCNC: 177 MG/DL
CO2 SERPL-SCNC: 27 MMOL/L (ref 20–32)
CREAT SERPL-MCNC: 0.96 MG/DL (ref 0.52–1.04)
GFR SERPL CREATININE-BSD FRML MDRD: 63 ML/MIN/{1.73_M2}
GLUCOSE SERPL-MCNC: 100 MG/DL (ref 70–99)
HDLC SERPL-MCNC: 45 MG/DL
LDLC SERPL CALC-MCNC: 106 MG/DL
NONHDLC SERPL-MCNC: 132 MG/DL
POTASSIUM SERPL-SCNC: 4.1 MMOL/L (ref 3.4–5.3)
PROT SERPL-MCNC: 7.9 G/DL (ref 6.8–8.8)
SODIUM SERPL-SCNC: 140 MMOL/L (ref 133–144)
TRIGL SERPL-MCNC: 130 MG/DL
TSH SERPL DL<=0.005 MIU/L-ACNC: 2.4 MU/L (ref 0.4–4)

## 2021-08-03 ENCOUNTER — TRANSFERRED RECORDS (OUTPATIENT)
Dept: HEALTH INFORMATION MANAGEMENT | Facility: CLINIC | Age: 61
End: 2021-08-03

## 2021-08-17 ENCOUNTER — OFFICE VISIT (OUTPATIENT)
Dept: VASCULAR SURGERY | Facility: CLINIC | Age: 61
End: 2021-08-17
Payer: COMMERCIAL

## 2021-08-17 DIAGNOSIS — I83.891 VARICOSE VEINS OF LEG WITH SWELLING, RIGHT: Primary | ICD-10-CM

## 2021-08-17 PROCEDURE — 99214 OFFICE O/P EST MOD 30 MIN: CPT | Performed by: SURGERY

## 2021-08-17 NOTE — PROGRESS NOTES
VEIN SOLUTIONS CONSULT    Impression:   1.  Right leg varicose veins with swelling.  She has been utilizing knee-high compression stockings for greater than 5 years and remains symptomatic.    Plan:   I had a nice discussion with Margarette reviewing basic venous pathophysiology.  She has been utilizing knee-high compression stockings of 15-20 mmHg pressure.  I am giving her a prescription for knee-high compression stockings of 20-30 mmHg pressure.  She enjoys hiking and has limited her activities as of late due to right leg discomfort and swelling.  She frequently takes over-the-counter NSAIDs for discomfort.  I will arrange for a right leg venous competency study and meet with her on the day of that exam.  Ultimate treatment recommendations will be pending the outcome of her symptoms with heavier grade compression stockings and the results of her right leg venous competency study.      HPI:   Margarette Hardin is a healthy 60-year-old female who presents at this time complaining of proximal right calf swelling for the past several years.  She was recently seen by her primary care provider who thought there were underlying varicosities in her proximal right calf.  She recommended venous surgical consultation.  Margarette presents today to discuss all the above.  6 years ago she noted prominence to her proximal right calf.  Ultrasound at that time was negative for right leg DVT.  She was given knee-high compression stockings of 15-20 mmHg pressure.  She wears those stockings on a daily basis.  They no longer seem to be controlling her proximal right calf edema.  She is complaining of worsening aching and discomfort and therefore presents today for evaluation.    Her mother had significant varicose veins.  Margarette is status post 2 prior pregnancies.  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  She has no personal history of venous intervention.      CURRENT MEDICATIONS  calcium carbonate  (OS-KINGS) 500 MG tablet, Take 1 tablet by mouth At Bedtime  hydrocortisone, Perianal, (HYDROCORTISONE) 2.5 % cream, Place rectally 2 times daily as needed for hemorrhoids 2 WEEKS ON AND 2 WEEKS OFF  MULTI-VITAMIN OR TABS, 1 TABLET DAILY  order for DME, Equipment being ordered:   Compression socks   15-20 mm hg   Dispense 4 pairs   Refill prn    No current facility-administered medications on file prior to visit.        PAST MEDICAL HISTORY  Past Medical History:   Diagnosis Date     Enthesopathy of hip region quiet     was prob a piriformis syndrome      H/O colposcopy with cervical biopsy 10/24/16    TYLER I     High risk HPV infection 16    HPV 16 with NIL pap         PAST SURGICAL HISTORY:  Past Surgical History:   Procedure Laterality Date     HC REMOVAL GALLBLADDER      laparoscopic cholecystectomy     ZZC NONSPECIFIC PROCEDURE      ERCP       ALLERGIES     Allergies   Allergen Reactions     Amoxicillin Hives       FAMILY HISTORY  Family History   Problem Relation Age of Onset     Arthritis Paternal Grandmother      Cancer Father         lung cancer     Diabetes Father      Cancer Maternal Grandmother         breast cancer with mets     Diabetes Mother         insulin dependent     Eye Disorder Mother         cataracts and glaucoma diabetic retinopathy     Osteoporosis Mother      Cardiovascular Mother         cardiomyopathy     Cancer Mother         Stomach CA -  at age 80 -      Heart Disease Sister         tumor in heart     Heart Disease Sister         heart murmur and valve replacemtent due to rheumatic fever       SOCIAL HISTORY  Social History     Tobacco Use     Smoking status: Never Smoker     Smokeless tobacco: Never Used   Substance Use Topics     Alcohol use: Yes     Comment: rare     Drug use: No       ROS:   Review of Systems   All other systems reviewed and are negative.        EXAM:  LMP 2012   Physical Exam  Constitutional:       Appearance: Normal appearance.   HENT:       Head: Normocephalic and atraumatic.   Eyes:      General: No scleral icterus.     Extraocular Movements: Extraocular movements intact.      Pupils: Pupils are equal, round, and reactive to light.   Cardiovascular:      Pulses:           Dorsalis pedis pulses are 2+ on the right side and 2+ on the left side.      Comments: Circumference of mid right calf is 43 cm.  Circumference of mid left calf is 41.5 cm.  No obvious varicosities in the right leg.  No ankle edema.  Musculoskeletal:         General: No deformity. Normal range of motion.      Cervical back: Normal range of motion.      Right lower leg: No edema.      Left lower leg: No edema.   Skin:     General: Skin is warm and dry.   Neurological:      General: No focal deficit present.      Mental Status: She is alert and oriented to person, place, and time. Mental status is at baseline.   Psychiatric:         Mood and Affect: Mood normal.         Behavior: Behavior normal.         Thought Content: Thought content normal.         Judgment: Judgment normal.         Labs:  LIPID RESULTS:  Lab Results   Component Value Date    CHOL 177 06/29/2021    HDL 45 (L) 06/29/2021     (H) 06/29/2021    TRIG 130 06/29/2021    CHOLHDLRATIO 4.2 11/11/2013       CBC RESULTS:  Lab Results   Component Value Date    WBC 5.1 06/29/2021    RBC 4.60 06/29/2021    HGB 13.9 06/29/2021    HCT 41.3 06/29/2021    MCV 90 06/29/2021    MCH 30.2 06/29/2021    MCHC 33.7 06/29/2021    RDW 12.7 06/29/2021     06/29/2021       BMP RESULTS:  Lab Results   Component Value Date     06/29/2021    POTASSIUM 4.1 06/29/2021    CHLORIDE 108 06/29/2021    CO2 27 06/29/2021    ANIONGAP 5 06/29/2021     (H) 06/29/2021    BUN 16 06/29/2021    CR 0.96 06/29/2021    GFRESTIMATED 63 06/29/2021    GFRESTBLACK 74 06/29/2021    KINGS 9.4 06/29/2021        A1C RESULTS:  Lab Results   Component Value Date    A1C 5.5 06/29/2021         Imaging:  I reviewed a right leg venous ultrasound from  2015.      Total length of this encounter was 30 minutes.        Roger Douglas MD

## 2021-08-17 NOTE — LETTER
8/17/2021         RE: Margarette Hardin  [address] [facility] [address]        Dear Colleague,    Thank you for referring your patient, Margarette Hardin, to the [facility] [place]. Please see a copy of my visit note below.    VEIN SOLUTIONS CONSULT    Impression:   1.  Right leg varicose veins with swelling.  She has been utilizing knee-high compression stockings for greater than 5 years and remains symptomatic.    Plan:   I had a nice discussion with Margarette reviewing basic venous pathophysiology.  She has been utilizing knee-high compression stockings of 15-20 mmHg pressure.  I am giving her a prescription for knee-high compression stockings of 20-30 mmHg pressure.  She enjoys hiking and has limited her activities as of late due to right leg discomfort and swelling.  She frequently takes over-the-counter NSAIDs for discomfort.  I will arrange for a right leg venous competency study and meet with her on the day of that exam.  Ultimate treatment recommendations will be pending the outcome of her symptoms with heavier grade compression stockings and the results of her right leg venous competency study.      HPI:   Margarette Hardin is a healthy 60-year-old female who presents at this time complaining of proximal right calf swelling for the past several years.  She was recently seen by her primary care provider who thought there were underlying varicosities in her proximal right calf.  She recommended venous surgical consultation.  Margarette presents today to discuss all the above.  6 years ago she noted prominence to her proximal right calf.  Ultrasound at that time was negative for right leg DVT.  She was given knee-high compression stockings of 15-20 mmHg pressure.  She wears those stockings on a daily basis.  They no longer seem to be controlling her proximal right calf edema.  She is complaining of worsening aching and discomfort and therefore presents today for evaluation.    Her mother had significant  varicose veins.  Margarette is status post 2 prior pregnancies.  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  She has no personal history of venous intervention.      CURRENT MEDICATIONS  calcium carbonate (OS-KINGS) 500 MG tablet, Take 1 tablet by mouth At Bedtime  hydrocortisone, Perianal, (HYDROCORTISONE) 2.5 % cream, Place rectally 2 times daily as needed for hemorrhoids 2 WEEKS ON AND 2 WEEKS OFF  MULTI-VITAMIN OR TABS, 1 TABLET DAILY  order for DME, Equipment being ordered:   Compression socks   15-20 mm hg   Dispense 4 pairs   Refill prn    No current facility-administered medications on file prior to visit.        PAST MEDICAL HISTORY  Past Medical History:   Diagnosis Date     Enthesopathy of hip region quiet     was prob a piriformis syndrome      H/O colposcopy with cervical biopsy 10/24/16    TYLER I     High risk HPV infection 16    HPV 16 with NIL pap         PAST SURGICAL HISTORY:  Past Surgical History:   Procedure Laterality Date     HC REMOVAL GALLBLADDER      laparoscopic cholecystectomy     ZZC NONSPECIFIC PROCEDURE      ERCP       ALLERGIES     Allergies   Allergen Reactions     Amoxicillin Hives       FAMILY HISTORY  Family History   Problem Relation Age of Onset     Arthritis Paternal Grandmother      Cancer Father         lung cancer     Diabetes Father      Cancer Maternal Grandmother         breast cancer with mets     Diabetes Mother         insulin dependent     Eye Disorder Mother         cataracts and glaucoma diabetic retinopathy     Osteoporosis Mother      Cardiovascular Mother         cardiomyopathy     Cancer Mother         Stomach CA -  at age 80 - 2005     Heart Disease Sister         tumor in heart     Heart Disease Sister         heart murmur and valve replacemtent due to rheumatic fever       SOCIAL HISTORY  Social History     Tobacco Use     Smoking status: Never Smoker     Smokeless tobacco: Never Used   Substance Use Topics      Alcohol use: Yes     Comment: rare     Drug use: No       ROS:   Review of Systems   All other systems reviewed and are negative.        EXAM:  LMP 04/01/2012   Physical Exam  Constitutional:       Appearance: Normal appearance.   HENT:      Head: Normocephalic and atraumatic.   Eyes:      General: No scleral icterus.     Extraocular Movements: Extraocular movements intact.      Pupils: Pupils are equal, round, and reactive to light.   Cardiovascular:      Pulses:           Dorsalis pedis pulses are 2+ on the right side and 2+ on the left side.      Comments: Circumference of mid right calf is 43 cm.  Circumference of mid left calf is 41.5 cm.  No obvious varicosities in the right leg.  No ankle edema.  Musculoskeletal:         General: No deformity. Normal range of motion.      Cervical back: Normal range of motion.      Right lower leg: No edema.      Left lower leg: No edema.   Skin:     General: Skin is warm and dry.   Neurological:      General: No focal deficit present.      Mental Status: She is alert and oriented to person, place, and time. Mental status is at baseline.   Psychiatric:         Mood and Affect: Mood normal.         Behavior: Behavior normal.         Thought Content: Thought content normal.         Judgment: Judgment normal.         Labs:  LIPID RESULTS:  Lab Results   Component Value Date    CHOL 177 06/29/2021    HDL 45 (L) 06/29/2021     (H) 06/29/2021    TRIG 130 06/29/2021    CHOLHDLRATIO 4.2 11/11/2013       CBC RESULTS:  Lab Results   Component Value Date    WBC 5.1 06/29/2021    RBC 4.60 06/29/2021    HGB 13.9 06/29/2021    HCT 41.3 06/29/2021    MCV 90 06/29/2021    MCH 30.2 06/29/2021    MCHC 33.7 06/29/2021    RDW 12.7 06/29/2021     06/29/2021       BMP RESULTS:  Lab Results   Component Value Date     06/29/2021    POTASSIUM 4.1 06/29/2021    CHLORIDE 108 06/29/2021    CO2 27 06/29/2021    ANIONGAP 5 06/29/2021     (H) 06/29/2021    BUN 16 06/29/2021    CR  0.96 06/29/2021    GFRESTIMATED 63 06/29/2021    GFRESTBLACK 74 06/29/2021    KINGS 9.4 06/29/2021        A1C RESULTS:  Lab Results   Component Value Date    A1C 5.5 06/29/2021         Imaging:  I reviewed a right leg venous ultrasound from 2015.      Total length of this encounter was 30 minutes.        Roger Douglas MD          Again, thank you for allowing me to participate in the care of your patient.        Sincerely,        Roger Douglas MD

## 2021-10-24 ENCOUNTER — HEALTH MAINTENANCE LETTER (OUTPATIENT)
Age: 61
End: 2021-10-24

## 2022-07-31 ENCOUNTER — HEALTH MAINTENANCE LETTER (OUTPATIENT)
Age: 62
End: 2022-07-31

## 2022-10-15 ENCOUNTER — HEALTH MAINTENANCE LETTER (OUTPATIENT)
Age: 62
End: 2022-10-15

## 2023-06-01 ENCOUNTER — HEALTH MAINTENANCE LETTER (OUTPATIENT)
Age: 63
End: 2023-06-01

## 2023-08-20 ENCOUNTER — HEALTH MAINTENANCE LETTER (OUTPATIENT)
Age: 63
End: 2023-08-20